# Patient Record
Sex: MALE | Race: WHITE | HISPANIC OR LATINO
[De-identification: names, ages, dates, MRNs, and addresses within clinical notes are randomized per-mention and may not be internally consistent; named-entity substitution may affect disease eponyms.]

---

## 2017-06-14 ENCOUNTER — FORM ENCOUNTER (OUTPATIENT)
Age: 67
End: 2017-06-14

## 2017-06-15 ENCOUNTER — OUTPATIENT (OUTPATIENT)
Dept: OUTPATIENT SERVICES | Facility: HOSPITAL | Age: 67
LOS: 1 days | End: 2017-06-15
Payer: MEDICARE

## 2017-06-15 ENCOUNTER — APPOINTMENT (OUTPATIENT)
Dept: CT IMAGING | Facility: CLINIC | Age: 67
End: 2017-06-15
Payer: MEDICARE

## 2017-06-15 DIAGNOSIS — R93.8 ABNORMAL FINDINGS ON DIAGNOSTIC IMAGING OF OTHER SPECIFIED BODY STRUCTURES: ICD-10-CM

## 2017-06-15 PROCEDURE — 71250 CT THORAX DX C-: CPT | Mod: 26

## 2017-06-15 PROCEDURE — 71250 CT THORAX DX C-: CPT

## 2017-06-21 ENCOUNTER — APPOINTMENT (OUTPATIENT)
Dept: PULMONOLOGY | Facility: CLINIC | Age: 67
End: 2017-06-21

## 2017-06-21 VITALS — BODY MASS INDEX: 25.79 KG/M2 | WEIGHT: 155 LBS

## 2017-06-21 VITALS
RESPIRATION RATE: 16 BRPM | DIASTOLIC BLOOD PRESSURE: 80 MMHG | OXYGEN SATURATION: 98 % | HEART RATE: 68 BPM | SYSTOLIC BLOOD PRESSURE: 122 MMHG

## 2017-06-21 DIAGNOSIS — Z01.811 ENCOUNTER FOR PREPROCEDURAL RESPIRATORY EXAMINATION: ICD-10-CM

## 2017-06-26 ENCOUNTER — MEDICATION RENEWAL (OUTPATIENT)
Age: 67
End: 2017-06-26

## 2017-06-27 ENCOUNTER — MEDICATION RENEWAL (OUTPATIENT)
Age: 67
End: 2017-06-27

## 2017-11-07 ENCOUNTER — APPOINTMENT (OUTPATIENT)
Dept: SURGERY | Facility: CLINIC | Age: 67
End: 2017-11-07
Payer: MEDICARE

## 2017-11-07 VITALS — DIASTOLIC BLOOD PRESSURE: 71 MMHG | SYSTOLIC BLOOD PRESSURE: 136 MMHG | BODY MASS INDEX: 25.63 KG/M2 | WEIGHT: 154 LBS

## 2017-11-07 DIAGNOSIS — Z87.19 PERSONAL HISTORY OF OTHER DISEASES OF THE DIGESTIVE SYSTEM: ICD-10-CM

## 2017-11-07 PROCEDURE — 99214 OFFICE O/P EST MOD 30 MIN: CPT

## 2017-11-28 ENCOUNTER — APPOINTMENT (OUTPATIENT)
Dept: SURGERY | Facility: CLINIC | Age: 67
End: 2017-11-28
Payer: MEDICARE

## 2017-11-28 VITALS
DIASTOLIC BLOOD PRESSURE: 69 MMHG | SYSTOLIC BLOOD PRESSURE: 131 MMHG | WEIGHT: 153 LBS | HEIGHT: 65 IN | BODY MASS INDEX: 25.49 KG/M2

## 2017-11-28 PROCEDURE — 99215 OFFICE O/P EST HI 40 MIN: CPT

## 2017-11-29 ENCOUNTER — OTHER (OUTPATIENT)
Age: 67
End: 2017-11-29

## 2017-11-29 RX ORDER — AZITHROMYCIN DIHYDRATE 250 MG/1
250 TABLET, FILM COATED ORAL
Qty: 1 | Refills: 0 | Status: DISCONTINUED | COMMUNITY
Start: 2017-06-21 | End: 2017-11-29

## 2017-12-04 ENCOUNTER — FORM ENCOUNTER (OUTPATIENT)
Age: 67
End: 2017-12-04

## 2017-12-05 ENCOUNTER — APPOINTMENT (OUTPATIENT)
Dept: CT IMAGING | Facility: CLINIC | Age: 67
End: 2017-12-05
Payer: MEDICARE

## 2017-12-05 ENCOUNTER — OUTPATIENT (OUTPATIENT)
Dept: OUTPATIENT SERVICES | Facility: HOSPITAL | Age: 67
LOS: 1 days | End: 2017-12-05
Payer: MEDICARE

## 2017-12-05 DIAGNOSIS — R93.8 ABNORMAL FINDINGS ON DIAGNOSTIC IMAGING OF OTHER SPECIFIED BODY STRUCTURES: ICD-10-CM

## 2017-12-05 PROCEDURE — 71250 CT THORAX DX C-: CPT

## 2017-12-05 PROCEDURE — 71250 CT THORAX DX C-: CPT | Mod: 26

## 2017-12-21 ENCOUNTER — APPOINTMENT (OUTPATIENT)
Dept: PULMONOLOGY | Facility: CLINIC | Age: 67
End: 2017-12-21
Payer: MEDICARE

## 2017-12-21 VITALS — BODY MASS INDEX: 24.79 KG/M2 | WEIGHT: 149 LBS

## 2017-12-21 VITALS — SYSTOLIC BLOOD PRESSURE: 120 MMHG | DIASTOLIC BLOOD PRESSURE: 70 MMHG | HEART RATE: 75 BPM | OXYGEN SATURATION: 95 %

## 2017-12-21 PROCEDURE — 99215 OFFICE O/P EST HI 40 MIN: CPT | Mod: 25

## 2017-12-21 PROCEDURE — 94010 BREATHING CAPACITY TEST: CPT

## 2017-12-29 ENCOUNTER — OUTPATIENT (OUTPATIENT)
Dept: OUTPATIENT SERVICES | Facility: HOSPITAL | Age: 67
LOS: 1 days | End: 2017-12-29
Payer: MEDICARE

## 2017-12-29 VITALS
WEIGHT: 149.03 LBS | TEMPERATURE: 98 F | HEIGHT: 66 IN | HEART RATE: 69 BPM | DIASTOLIC BLOOD PRESSURE: 68 MMHG | RESPIRATION RATE: 17 BRPM | SYSTOLIC BLOOD PRESSURE: 140 MMHG

## 2017-12-29 DIAGNOSIS — K40.91 UNILATERAL INGUINAL HERNIA, WITHOUT OBSTRUCTION OR GANGRENE, RECURRENT: ICD-10-CM

## 2017-12-29 DIAGNOSIS — K40.90 UNILATERAL INGUINAL HERNIA, WITHOUT OBSTRUCTION OR GANGRENE, NOT SPECIFIED AS RECURRENT: ICD-10-CM

## 2017-12-29 DIAGNOSIS — Z98.890 OTHER SPECIFIED POSTPROCEDURAL STATES: Chronic | ICD-10-CM

## 2017-12-29 DIAGNOSIS — Z01.818 ENCOUNTER FOR OTHER PREPROCEDURAL EXAMINATION: ICD-10-CM

## 2017-12-29 LAB
ALBUMIN SERPL ELPH-MCNC: 3.8 G/DL — SIGNIFICANT CHANGE UP (ref 3.3–5)
ALP SERPL-CCNC: 65 U/L — SIGNIFICANT CHANGE UP (ref 40–120)
ALT FLD-CCNC: 39 U/L — SIGNIFICANT CHANGE UP (ref 12–78)
ANION GAP SERPL CALC-SCNC: 5 MMOL/L — SIGNIFICANT CHANGE UP (ref 5–17)
AST SERPL-CCNC: 26 U/L — SIGNIFICANT CHANGE UP (ref 15–37)
BILIRUB SERPL-MCNC: 0.3 MG/DL — SIGNIFICANT CHANGE UP (ref 0.2–1.2)
BUN SERPL-MCNC: 14 MG/DL — SIGNIFICANT CHANGE UP (ref 7–23)
CALCIUM SERPL-MCNC: 9 MG/DL — SIGNIFICANT CHANGE UP (ref 8.5–10.1)
CHLORIDE SERPL-SCNC: 109 MMOL/L — HIGH (ref 96–108)
CO2 SERPL-SCNC: 30 MMOL/L — SIGNIFICANT CHANGE UP (ref 22–31)
CREAT SERPL-MCNC: 0.94 MG/DL — SIGNIFICANT CHANGE UP (ref 0.5–1.3)
GLUCOSE SERPL-MCNC: 102 MG/DL — HIGH (ref 70–99)
HCT VFR BLD CALC: 45.4 % — SIGNIFICANT CHANGE UP (ref 39–50)
HGB BLD-MCNC: 15.1 G/DL — SIGNIFICANT CHANGE UP (ref 13–17)
MCHC RBC-ENTMCNC: 31.1 PG — SIGNIFICANT CHANGE UP (ref 27–34)
MCHC RBC-ENTMCNC: 33.3 GM/DL — SIGNIFICANT CHANGE UP (ref 32–36)
MCV RBC AUTO: 93.4 FL — SIGNIFICANT CHANGE UP (ref 80–100)
PLATELET # BLD AUTO: 165 K/UL — SIGNIFICANT CHANGE UP (ref 150–400)
POTASSIUM SERPL-MCNC: 4.8 MMOL/L — SIGNIFICANT CHANGE UP (ref 3.5–5.3)
POTASSIUM SERPL-SCNC: 4.8 MMOL/L — SIGNIFICANT CHANGE UP (ref 3.5–5.3)
PROT SERPL-MCNC: 7.6 G/DL — SIGNIFICANT CHANGE UP (ref 6–8.3)
RBC # BLD: 4.86 M/UL — SIGNIFICANT CHANGE UP (ref 4.2–5.8)
RBC # FLD: 12.4 % — SIGNIFICANT CHANGE UP (ref 10.3–14.5)
SODIUM SERPL-SCNC: 144 MMOL/L — SIGNIFICANT CHANGE UP (ref 135–145)
WBC # BLD: 4.9 K/UL — SIGNIFICANT CHANGE UP (ref 3.8–10.5)
WBC # FLD AUTO: 4.9 K/UL — SIGNIFICANT CHANGE UP (ref 3.8–10.5)

## 2017-12-29 PROCEDURE — 93005 ELECTROCARDIOGRAM TRACING: CPT

## 2017-12-29 PROCEDURE — 93010 ELECTROCARDIOGRAM REPORT: CPT | Mod: NC

## 2017-12-29 PROCEDURE — 85027 COMPLETE CBC AUTOMATED: CPT

## 2017-12-29 PROCEDURE — 80053 COMPREHEN METABOLIC PANEL: CPT

## 2017-12-29 PROCEDURE — G0463: CPT

## 2017-12-29 NOTE — H&P PST ADULT - PROBLEM SELECTOR PLAN 1
Adequate: hears normal conversation without difficulty
PST: CBC,CM,EKG  Medical evaluation with Dr. Islas  All preoperative instructions including/CHD cleanse reviewed with patient who verbalized understanding.   Avoid all NSAID/ASA containing products as well as all herbal/vitamin supplements. Tylenol only for pain/headache.

## 2017-12-29 NOTE — H&P PST ADULT - HISTORY OF PRESENT ILLNESS
This 68yo male with a history of Emphysema presents to [facility] for a scheduled Open repair of recurrent right inguinal hernia  with Dr Heller on 1/8/18. He developed pain in his right groin a few months ago. The pain was its worse on Thanksgiving and he "was not wearing my hernia belt". He denies any abdominal pain, N/V, changes in bowels or urinary pattern.

## 2017-12-29 NOTE — H&P PST ADULT - VISION (WITH CORRECTIVE LENSES IF THE PATIENT USUALLY WEARS THEM):
RX Eyeglasses/Normal vision: sees adequately in most situations; can see medication labels, newsprint

## 2017-12-29 NOTE — H&P PST ADULT - PMH
Arthritis  Hands  Asthma    Chronic obstructive pulmonary disease, unspecified COPD type  Pt notes "Slight case of COPD" - Pulmonary note pt has Nodular Opacities on Chest CT  Pneumonia  X 1  Unilateral inguinal hernia without obstruction or gangrene, recurrence not specified

## 2017-12-29 NOTE — H&P PST ADULT - NSANTHOSAYNRD_GEN_A_CORE
No. POLY screening performed.  STOP BANG Legend: 0-2 = LOW Risk; 3-4 = INTERMEDIATE Risk; 5-8 = HIGH Risk

## 2017-12-29 NOTE — H&P PST ADULT - NEGATIVE ENMT SYMPTOMS
no nasal discharge/no throat pain/no hearing difficulty/no sinus symptoms/no ear pain/no nasal congestion

## 2018-01-05 ENCOUNTER — MEDICATION RENEWAL (OUTPATIENT)
Age: 68
End: 2018-01-05

## 2018-01-05 RX ORDER — SODIUM CHLORIDE 9 MG/ML
1000 INJECTION, SOLUTION INTRAVENOUS
Qty: 0 | Refills: 0 | Status: DISCONTINUED | OUTPATIENT
Start: 2018-01-08 | End: 2018-01-08

## 2018-01-08 ENCOUNTER — APPOINTMENT (OUTPATIENT)
Dept: SURGERY | Facility: HOSPITAL | Age: 68
End: 2018-01-08

## 2018-01-08 ENCOUNTER — OUTPATIENT (OUTPATIENT)
Dept: OUTPATIENT SERVICES | Facility: HOSPITAL | Age: 68
LOS: 1 days | End: 2018-01-08
Payer: MEDICARE

## 2018-01-08 ENCOUNTER — TRANSCRIPTION ENCOUNTER (OUTPATIENT)
Age: 68
End: 2018-01-08

## 2018-01-08 VITALS
DIASTOLIC BLOOD PRESSURE: 53 MMHG | HEART RATE: 75 BPM | RESPIRATION RATE: 14 BRPM | SYSTOLIC BLOOD PRESSURE: 120 MMHG | OXYGEN SATURATION: 96 %

## 2018-01-08 VITALS
DIASTOLIC BLOOD PRESSURE: 58 MMHG | RESPIRATION RATE: 16 BRPM | HEART RATE: 66 BPM | OXYGEN SATURATION: 98 % | HEIGHT: 66 IN | SYSTOLIC BLOOD PRESSURE: 116 MMHG | TEMPERATURE: 97 F | WEIGHT: 145.06 LBS

## 2018-01-08 DIAGNOSIS — K40.91 UNILATERAL INGUINAL HERNIA, WITHOUT OBSTRUCTION OR GANGRENE, RECURRENT: ICD-10-CM

## 2018-01-08 DIAGNOSIS — Z98.890 OTHER SPECIFIED POSTPROCEDURAL STATES: Chronic | ICD-10-CM

## 2018-01-08 DIAGNOSIS — Z01.818 ENCOUNTER FOR OTHER PREPROCEDURAL EXAMINATION: ICD-10-CM

## 2018-01-08 PROCEDURE — 49520 REREPAIR ING HERNIA REDUCE: CPT | Mod: RT

## 2018-01-08 PROCEDURE — C1781: CPT

## 2018-01-08 PROCEDURE — 49520 REREPAIR ING HERNIA REDUCE: CPT | Mod: AS,RT

## 2018-01-08 RX ORDER — CEFAZOLIN SODIUM 1 G
2000 VIAL (EA) INJECTION ONCE
Qty: 0 | Refills: 0 | Status: COMPLETED | OUTPATIENT
Start: 2018-01-08 | End: 2018-01-08

## 2018-01-08 RX ORDER — OXYCODONE HYDROCHLORIDE 5 MG/1
5 TABLET ORAL ONCE
Qty: 0 | Refills: 0 | Status: DISCONTINUED | OUTPATIENT
Start: 2018-01-08 | End: 2018-01-08

## 2018-01-08 RX ORDER — SODIUM CHLORIDE 9 MG/ML
1000 INJECTION, SOLUTION INTRAVENOUS
Qty: 0 | Refills: 0 | Status: DISCONTINUED | OUTPATIENT
Start: 2018-01-08 | End: 2018-01-08

## 2018-01-08 RX ORDER — ACETAMINOPHEN 500 MG
1000 TABLET ORAL ONCE
Qty: 0 | Refills: 0 | Status: COMPLETED | OUTPATIENT
Start: 2018-01-08 | End: 2018-01-08

## 2018-01-08 RX ORDER — HYDROMORPHONE HYDROCHLORIDE 2 MG/ML
0.5 INJECTION INTRAMUSCULAR; INTRAVENOUS; SUBCUTANEOUS
Qty: 0 | Refills: 0 | Status: DISCONTINUED | OUTPATIENT
Start: 2018-01-08 | End: 2018-01-08

## 2018-01-08 RX ORDER — UMECLIDINIUM 62.5 UG/1
0 AEROSOL, POWDER ORAL
Qty: 0 | Refills: 0 | COMMUNITY

## 2018-01-08 RX ADMIN — HYDROMORPHONE HYDROCHLORIDE 0.5 MILLIGRAM(S): 2 INJECTION INTRAMUSCULAR; INTRAVENOUS; SUBCUTANEOUS at 16:03

## 2018-01-08 RX ADMIN — SODIUM CHLORIDE 110 MILLILITER(S): 9 INJECTION, SOLUTION INTRAVENOUS at 16:01

## 2018-01-08 RX ADMIN — HYDROMORPHONE HYDROCHLORIDE 0.5 MILLIGRAM(S): 2 INJECTION INTRAMUSCULAR; INTRAVENOUS; SUBCUTANEOUS at 16:14

## 2018-01-08 RX ADMIN — SODIUM CHLORIDE 40 MILLILITER(S): 9 INJECTION, SOLUTION INTRAVENOUS at 11:16

## 2018-01-08 NOTE — ASU PATIENT PROFILE, ADULT - VISION (WITH CORRECTIVE LENSES IF THE PATIENT USUALLY WEARS THEM):
Normal vision: sees adequately in most situations; can see medication labels, newsprint/RX Eyeglasses

## 2018-01-08 NOTE — ASU PATIENT PROFILE, ADULT - METHOD:
will fax PST results to PCP and Pulmonologist for review and medical clearance & Pulmonary Clearance

## 2018-01-08 NOTE — ASU DISCHARGE PLAN (ADULT/PEDIATRIC). - MEDICATION SUMMARY - MEDICATIONS TO TAKE
I will START or STAY ON the medications listed below when I get home from the hospital:    Percocet 5/325 oral tablet  -- 1 tab(s) by mouth every 4 to 6 hours, As Needed -for severe pain MDD:6   -- Caution federal law prohibits the transfer of this drug to any person other  than the person for whom it was prescribed.  May cause drowsiness.  Alcohol may intensify this effect.  Use care when operating dangerous machinery.  This prescription cannot be refilled.  This product contains acetaminophen.  Do not use  with any other product containing acetaminophen to prevent possible liver damage.  Using more of this medication than prescribed may cause serious breathing problems.    -- Indication: For pain    Incruse Ellipta 62.5 mcg/inh inhalation powder  -- 1 puff daily  -- Indication: For prior med    Advair Diskus 250 mcg-50 mcg inhalation powder  -- 1 puff(s) inhaled once a day  -- Pt Notes if he takes Advair MORE THAN once a day he developes Pruritis on his back - only takes once a day - no side effects  -- Indication: For prior med

## 2018-01-08 NOTE — BRIEF OPERATIVE NOTE - PRE-OP DX
Unilateral recurrent inguinal hernia without obstruction or gangrene  01/08/2018    Active  Daniel Cid

## 2018-01-08 NOTE — BRIEF OPERATIVE NOTE - POST-OP DX
Unilateral inguinal hernia without obstruction or gangrene, recurrent  01/08/2018    Active  Daniel Cid

## 2018-01-08 NOTE — ASU DISCHARGE PLAN (ADULT/PEDIATRIC). - NOTIFY
Bleeding that does not stop/Pain not relieved by Medications/Fever greater than 101 Persistent Nausea and Vomiting/Bleeding that does not stop/Unable to Urinate/Pain not relieved by Medications/Fever greater than 101

## 2018-01-08 NOTE — ASU PATIENT PROFILE, ADULT - PMH
Arthritis  Hands  Asthma    Chronic obstructive pulmonary disease, unspecified COPD type  Pt notes "Slight case of COPD"  Pneumonia  X 1  Unilateral inguinal hernia without obstruction or gangrene, recurrence not specified

## 2018-01-08 NOTE — BRIEF OPERATIVE NOTE - PROCEDURE
<<-----Click on this checkbox to enter Procedure Herniorrhaphy, inguinal  01/08/2018  Recurrent right inguinal herniorrhaphy  Active  HORTENCIA

## 2018-01-16 ENCOUNTER — APPOINTMENT (OUTPATIENT)
Dept: SURGERY | Facility: CLINIC | Age: 68
End: 2018-01-16
Payer: MEDICARE

## 2018-01-16 DIAGNOSIS — K40.91 UNILATERAL INGUINAL HERNIA, W/OUT OBSTRUCTION OR GANGRENE, RECURRENT: ICD-10-CM

## 2018-01-16 PROCEDURE — 99024 POSTOP FOLLOW-UP VISIT: CPT

## 2018-02-13 ENCOUNTER — APPOINTMENT (OUTPATIENT)
Dept: SURGERY | Facility: CLINIC | Age: 68
End: 2018-02-13
Payer: MEDICARE

## 2018-02-13 DIAGNOSIS — R10.31 RIGHT LOWER QUADRANT PAIN: ICD-10-CM

## 2018-02-13 PROCEDURE — 99024 POSTOP FOLLOW-UP VISIT: CPT

## 2018-02-16 PROBLEM — R10.31 RIGHT INGUINAL PAIN: Status: RESOLVED | Noted: 2017-11-07 | Resolved: 2018-02-16

## 2018-06-28 ENCOUNTER — APPOINTMENT (OUTPATIENT)
Dept: PULMONOLOGY | Facility: CLINIC | Age: 68
End: 2018-06-28
Payer: MEDICARE

## 2018-06-28 VITALS — WEIGHT: 152 LBS | BODY MASS INDEX: 25.29 KG/M2

## 2018-06-28 VITALS — OXYGEN SATURATION: 97 % | HEART RATE: 76 BPM | SYSTOLIC BLOOD PRESSURE: 124 MMHG | DIASTOLIC BLOOD PRESSURE: 80 MMHG

## 2018-06-28 PROCEDURE — 94010 BREATHING CAPACITY TEST: CPT

## 2018-06-28 PROCEDURE — 99214 OFFICE O/P EST MOD 30 MIN: CPT | Mod: 25

## 2018-07-23 ENCOUNTER — RX RENEWAL (OUTPATIENT)
Age: 68
End: 2018-07-23

## 2018-08-23 ENCOUNTER — RX RENEWAL (OUTPATIENT)
Age: 68
End: 2018-08-23

## 2018-09-04 ENCOUNTER — RX RENEWAL (OUTPATIENT)
Age: 68
End: 2018-09-04

## 2018-09-26 ENCOUNTER — MEDICATION RENEWAL (OUTPATIENT)
Age: 68
End: 2018-09-26

## 2019-01-09 ENCOUNTER — FORM ENCOUNTER (OUTPATIENT)
Age: 69
End: 2019-01-09

## 2019-01-10 ENCOUNTER — APPOINTMENT (OUTPATIENT)
Dept: CT IMAGING | Facility: CLINIC | Age: 69
End: 2019-01-10
Payer: MEDICARE

## 2019-01-10 ENCOUNTER — OUTPATIENT (OUTPATIENT)
Dept: OUTPATIENT SERVICES | Facility: HOSPITAL | Age: 69
LOS: 1 days | End: 2019-01-10
Payer: MEDICARE

## 2019-01-10 DIAGNOSIS — R93.89 ABNORMAL FINDINGS ON DIAGNOSTIC IMAGING OF OTHER SPECIFIED BODY STRUCTURES: ICD-10-CM

## 2019-01-10 DIAGNOSIS — Z98.890 OTHER SPECIFIED POSTPROCEDURAL STATES: Chronic | ICD-10-CM

## 2019-01-10 DIAGNOSIS — R91.1 SOLITARY PULMONARY NODULE: ICD-10-CM

## 2019-01-10 PROCEDURE — 71250 CT THORAX DX C-: CPT | Mod: 26

## 2019-01-10 PROCEDURE — 71250 CT THORAX DX C-: CPT

## 2019-02-14 ENCOUNTER — APPOINTMENT (OUTPATIENT)
Dept: PULMONOLOGY | Facility: CLINIC | Age: 69
End: 2019-02-14
Payer: MEDICARE

## 2019-02-14 VITALS
DIASTOLIC BLOOD PRESSURE: 76 MMHG | SYSTOLIC BLOOD PRESSURE: 138 MMHG | OXYGEN SATURATION: 97 % | WEIGHT: 147 LBS | BODY MASS INDEX: 24.46 KG/M2 | HEART RATE: 79 BPM

## 2019-02-14 PROCEDURE — 99214 OFFICE O/P EST MOD 30 MIN: CPT

## 2019-02-14 NOTE — PROCEDURE
[FreeTextEntry1] : PFTs performed previously revealed moderate COPD with elevated lung volumes and diffusion capacity. These results were essentially at his baseline though spirometry is slightly worse and may be due to his non-compliance with medications.\par Spirometry 6/28/18 - near baseline and was improved from his previous and is c/w mild COPD (Stage II); he is more compliant with his inhaler therapy

## 2019-02-14 NOTE — REVIEW OF SYSTEMS
[Snoring] : snoring [Fever] : no fever [Chills] : no chills [Dry Eyes] : no dryness of the eyes [Eye Irritation] : no ~T irritation of the eyes [Nasal Congestion] : no nasal congestion [Epistaxis] : no nosebleeds [Postnasal Drip] : no postnasal drip [Sinus Problems] : no sinus problems [Cough] : no cough [Sputum] : not coughing up ~M sputum [Hemoptysis] : no hemoptysis [Dyspnea] : no dyspnea [Chest Tightness] : no chest tightness [Pleuritic Pain] : no pleuritic pain [Wheezing] : no wheezing [Hypertension] : no ~T hypertension [Chest Discomfort] : no chest discomfort [Dysrhythmia] : no dysrhythmia [Murmurs] : no murmurs were heard [Palpitations] : no palpitations [Edema] : ~T edema was not present [Hay Fever] : no hay fever [Reflux] : no reflux [Nausea] : no nausea [Vomiting] : no vomiting [Constipation] : no constipation [Diarrhea] : no diarrhea [Abdominal Pain] : no abdominal pain [Dysuria] : no dysuria [Trauma] : no ~T physical trauma [Fracture] : no fracture [Back Pain] : ~T no back pain [Anemia] : no anemia [Headache] : no headache [Dizziness] : no dizziness [Syncope] : no fainting [Numbness] : no numbness [Paralysis] : no paralysis was seen [Seizures] : no seizures [Depression] : no depression [Anxiety] : no anxiety [Diabetes] : no diabetes mellitus [Thyroid Problem] : no thyroid problem [Difficulty Initiating Sleep] : no difficulty falling asleep [Difficulty Maintaining Sleep] : no difficulty maintaining sleep [Witnessed Apneas] : demonstrated no ~M apnea [Nonrestorative Sleep] : restorative sleep

## 2019-02-14 NOTE — CONSULT LETTER
[Dear  ___] : Dear  [unfilled], [Consult Letter:] : I had the pleasure of evaluating your patient, [unfilled]. [Please see my note below.] : Please see my note below. [Consult Closing:] : Thank you very much for allowing me to participate in the care of this patient.  If you have any questions, please do not hesitate to contact me. [Sincerely,] : Sincerely, [Richardson Babcock MD] : Richardson Babcock MD [FreeTextEntry3] : Richardson Babcock MD, FCCP, CHANDLER. ABSM\par

## 2019-02-14 NOTE — DISCUSSION/SUMMARY
[FreeTextEntry1] : \par    #1. The patient's COPD appears to be at baseline clinically. He has not been compliant with his Advair 500/50 and  Atrovent, without any further rash.  He uses his inhalers when he wants but reports improved compliance\par #2. He will also continue his prn Ventolin.  \par #3. I have also recommended diet and exercise for weight loss. \par #4. Chest CT with waxing and waning opacities but current is stable \par #5. Follow-up CT in 12 months (1-2/2010) for reevaluation of GGO and possible mucoid impaction\par #6. F/u in 6 months with PFTs

## 2019-02-14 NOTE — REASON FOR VISIT
[Follow-Up] : a follow-up visit [Abnormal CT Scan] : abnormal CT Scan [COPD] : COPD [Shortness of Breath] : shortness of Breath [FreeTextEntry2] : GGO/nodule, SOBOE

## 2019-02-14 NOTE — HISTORY OF PRESENT ILLNESS
[Doing Well] : doing well [Goals--Doing Well] : the patient is doing well with ~his/her~ COPD goals [CT Scan] : a CT scan [On ___] : performed on [unfilled] [Patient] : the patient [Indication ___] : for an indication of [unfilled] [None] : no new symptoms reported [FreeTextEntry1] : The patient is also followed for nodular opacities seen on previous chest CT which were thought to be due to mucoid impaction. Repeat chest CT revealed a new 6 mm GGO in ODALIS and tree-in-bud area in RUL. He was asymptomatic without any respiratory complaints. I had recommended a f/u chest CT in 4-5 months but he wanted to wait longer than that. Repeat CT revealed resolution of a 1.2 cm RUL opacity and possible mild progression of tree-in-bud nodularity in RML and LLL. Pt did not want to repeat an imaging study for at least one year. He understands the risk of progression of disease but because he is feeling well, he would like to wait.\par Pt did well with hernia surgery in the past.\par He is somewhat non-compliant with inhaler therapy and recommendations for imaging studies at times but is more compliant now. [Adherent] : the patient is not adherent with ~his/her~ medication regimen [FreeTextEntry5] : Chest CT [FreeTextEntry8] : New 1.2 cm GGO in RUL with otherwise stable or improved changes

## 2019-02-14 NOTE — PHYSICAL EXAM
[Normal Appearance] : normal appearance [Normal Conjunctiva] : the conjunctiva exhibited no abnormalities [Low Lying Soft Palate] : low lying soft palate [Elongated Uvula] : elongated uvula [Enlarged Base of the Tongue] : enlargement of the base of the tongue [III] : III [Neck Appearance] : the appearance of the neck was normal [Heart Rate And Rhythm] : heart rate and rhythm were normal [Heart Sounds] : normal S1 and S2 [Murmurs] : no murmurs present [Edema] : no peripheral edema present [] : no respiratory distress [Respiration, Rhythm And Depth] : normal respiratory rhythm and effort [Exaggerated Use Of Accessory Muscles For Inspiration] : no accessory muscle use [Auscultation Breath Sounds / Voice Sounds] : lungs were clear to auscultation bilaterally [Abdomen Soft] : soft [Abdomen Tenderness] : non-tender [Abnormal Walk] : normal gait [Nail Clubbing] : no clubbing of the fingernails [Cyanosis, Localized] : no localized cyanosis [Oriented To Time, Place, And Person] : oriented to person, place, and time [FreeTextEntry1] : No abnormalities

## 2019-02-22 ENCOUNTER — RX RENEWAL (OUTPATIENT)
Age: 69
End: 2019-02-22

## 2019-08-14 ENCOUNTER — APPOINTMENT (OUTPATIENT)
Dept: PULMONOLOGY | Facility: CLINIC | Age: 69
End: 2019-08-14
Payer: MEDICARE

## 2019-08-14 VITALS
DIASTOLIC BLOOD PRESSURE: 80 MMHG | BODY MASS INDEX: 25.79 KG/M2 | HEART RATE: 67 BPM | SYSTOLIC BLOOD PRESSURE: 130 MMHG | RESPIRATION RATE: 14 BRPM | OXYGEN SATURATION: 95 % | WEIGHT: 155 LBS

## 2019-08-14 PROCEDURE — 85018 HEMOGLOBIN: CPT | Mod: QW

## 2019-08-14 PROCEDURE — 94727 GAS DIL/WSHOT DETER LNG VOL: CPT

## 2019-08-14 PROCEDURE — 94729 DIFFUSING CAPACITY: CPT

## 2019-08-14 PROCEDURE — 94010 BREATHING CAPACITY TEST: CPT

## 2019-08-14 PROCEDURE — 99214 OFFICE O/P EST MOD 30 MIN: CPT | Mod: 25

## 2019-08-14 NOTE — HISTORY OF PRESENT ILLNESS
[Doing Well] : doing well [Goals--Doing Well] : the patient is doing well with ~his/her~ COPD goals [CT Scan] : a CT scan [On ___] : performed on [unfilled] [Patient] : the patient [Indication ___] : for an indication of [unfilled] [None] : no new symptoms reported [FreeTextEntry1] : The patient is also followed for nodular opacities seen on previous chest CT which were thought to be due to mucoid impaction. Repeat chest CT revealed a new 6 mm GGO in ODALIS and tree-in-bud area in RUL. He was asymptomatic without any respiratory complaints. He has had waxing and waning nodules and tree-in-bud opacities for years.\par He is somewhat non-compliant with inhaler therapy and recommendations for imaging studies at times but is more compliant now. [Adherent] : the patient is not adherent with ~his/her~ medication regimen [FreeTextEntry8] : New 1.2 cm GGO in RUL with otherwise stable or improved changes [FreeTextEntry5] : Chest CT

## 2019-08-14 NOTE — CONSULT LETTER
[Dear  ___] : Dear  [unfilled], [Consult Letter:] : I had the pleasure of evaluating your patient, [unfilled]. [Consult Closing:] : Thank you very much for allowing me to participate in the care of this patient.  If you have any questions, please do not hesitate to contact me. [Please see my note below.] : Please see my note below. [Sincerely,] : Sincerely, [Richardson Babcock MD] : Richardson Babcock MD [FreeTextEntry3] : Richardson Babcock MD, FCCP, CHANDLER. ABSM\par

## 2019-08-14 NOTE — DISCUSSION/SUMMARY
[FreeTextEntry1] : \par    #1. The patient's COPD appears to be at baseline clinically on Advair 250/50 and Incruse; alternatively could use Trelegy daily\par #2. He will also continue his prn Ventolin.  \par #3. I have also recommended diet and exercise for weight loss. \par #4. Chest CT with waxing and waning opacities but current is stable; repeat in 1-2/2020\par #5. Follow-up CT in 12 months (1-2/2010) for reevaluation of GGO and possible mucoid impaction\par #6. F/u in 6 months with CT

## 2019-08-14 NOTE — REVIEW OF SYSTEMS
[Snoring] : snoring [Fever] : no fever [Dry Eyes] : no dryness of the eyes [Chills] : no chills [Nasal Congestion] : no nasal congestion [Eye Irritation] : no ~T irritation of the eyes [Epistaxis] : no nosebleeds [Postnasal Drip] : no postnasal drip [Sinus Problems] : no sinus problems [Sputum] : not coughing up ~M sputum [Cough] : no cough [Dyspnea] : no dyspnea [Hemoptysis] : no hemoptysis [Pleuritic Pain] : no pleuritic pain [Chest Tightness] : no chest tightness [Hypertension] : no ~T hypertension [Wheezing] : no wheezing [Dysrhythmia] : no dysrhythmia [Chest Discomfort] : no chest discomfort [Murmurs] : no murmurs were heard [Palpitations] : no palpitations [Edema] : ~T edema was not present [Hay Fever] : no hay fever [Reflux] : no reflux [Vomiting] : no vomiting [Nausea] : no nausea [Diarrhea] : no diarrhea [Constipation] : no constipation [Abdominal Pain] : no abdominal pain [Dysuria] : no dysuria [Fracture] : no fracture [Trauma] : no ~T physical trauma [Anemia] : no anemia [Back Pain] : ~T no back pain [Headache] : no headache [Dizziness] : no dizziness [Syncope] : no fainting [Seizures] : no seizures [Paralysis] : no paralysis was seen [Numbness] : no numbness [Depression] : no depression [Anxiety] : no anxiety [Diabetes] : no diabetes mellitus [Thyroid Problem] : no thyroid problem [Difficulty Initiating Sleep] : no difficulty falling asleep [Difficulty Maintaining Sleep] : no difficulty maintaining sleep [Witnessed Apneas] : demonstrated no ~M apnea [Nonrestorative Sleep] : restorative sleep

## 2019-08-14 NOTE — PHYSICAL EXAM
[Normal Appearance] : normal appearance [Normal Conjunctiva] : the conjunctiva exhibited no abnormalities [Low Lying Soft Palate] : low lying soft palate [Elongated Uvula] : elongated uvula [III] : III [Enlarged Base of the Tongue] : enlargement of the base of the tongue [Neck Appearance] : the appearance of the neck was normal [Heart Rate And Rhythm] : heart rate and rhythm were normal [Heart Sounds] : normal S1 and S2 [Murmurs] : no murmurs present [Edema] : no peripheral edema present [Respiration, Rhythm And Depth] : normal respiratory rhythm and effort [] : no respiratory distress [Auscultation Breath Sounds / Voice Sounds] : lungs were clear to auscultation bilaterally [Exaggerated Use Of Accessory Muscles For Inspiration] : no accessory muscle use [Abdomen Tenderness] : non-tender [Abdomen Soft] : soft [Abnormal Walk] : normal gait [Cyanosis, Localized] : no localized cyanosis [Nail Clubbing] : no clubbing of the fingernails [No Focal Deficits] : no focal deficits [Oriented To Time, Place, And Person] : oriented to person, place, and time [FreeTextEntry1] : No abnormalities

## 2019-09-05 RX ORDER — UMECLIDINIUM 62.5 UG/1
62.5 AEROSOL, POWDER ORAL DAILY
Qty: 1 | Refills: 5 | Status: DISCONTINUED | COMMUNITY
Start: 2017-06-26 | End: 2019-09-05

## 2020-02-04 ENCOUNTER — FORM ENCOUNTER (OUTPATIENT)
Age: 70
End: 2020-02-04

## 2020-02-05 ENCOUNTER — OUTPATIENT (OUTPATIENT)
Dept: OUTPATIENT SERVICES | Facility: HOSPITAL | Age: 70
LOS: 1 days | End: 2020-02-05
Payer: MEDICARE

## 2020-02-05 ENCOUNTER — APPOINTMENT (OUTPATIENT)
Dept: CT IMAGING | Facility: CLINIC | Age: 70
End: 2020-02-05
Payer: MEDICARE

## 2020-02-05 DIAGNOSIS — R93.89 ABNORMAL FINDINGS ON DIAGNOSTIC IMAGING OF OTHER SPECIFIED BODY STRUCTURES: ICD-10-CM

## 2020-02-05 DIAGNOSIS — Z98.890 OTHER SPECIFIED POSTPROCEDURAL STATES: Chronic | ICD-10-CM

## 2020-02-05 PROCEDURE — 71250 CT THORAX DX C-: CPT

## 2020-02-05 PROCEDURE — 71250 CT THORAX DX C-: CPT | Mod: 26

## 2020-02-18 ENCOUNTER — APPOINTMENT (OUTPATIENT)
Dept: PULMONOLOGY | Facility: CLINIC | Age: 70
End: 2020-02-18
Payer: MEDICARE

## 2020-02-18 VITALS
WEIGHT: 151 LBS | DIASTOLIC BLOOD PRESSURE: 66 MMHG | HEART RATE: 70 BPM | BODY MASS INDEX: 25.16 KG/M2 | HEIGHT: 65 IN | SYSTOLIC BLOOD PRESSURE: 136 MMHG | OXYGEN SATURATION: 96 %

## 2020-02-18 PROCEDURE — 99214 OFFICE O/P EST MOD 30 MIN: CPT

## 2020-02-18 NOTE — REVIEW OF SYSTEMS
Patient had a trop level of 2.46. [Snoring] : snoring [Fever] : no fever [Chills] : no chills [Dry Eyes] : no dryness of the eyes [Eye Irritation] : no ~T irritation of the eyes [Nasal Congestion] : no nasal congestion [Sinus Problems] : no sinus problems [Postnasal Drip] : no postnasal drip [Epistaxis] : no nosebleeds [Hemoptysis] : no hemoptysis [Sputum] : not coughing up ~M sputum [Cough] : no cough [Chest Tightness] : no chest tightness [Dyspnea] : no dyspnea [Pleuritic Pain] : no pleuritic pain [Hypertension] : no ~T hypertension [Wheezing] : no wheezing [Dysrhythmia] : no dysrhythmia [Chest Discomfort] : no chest discomfort [Palpitations] : no palpitations [Murmurs] : no murmurs were heard [Hay Fever] : no hay fever [Edema] : ~T edema was not present [Vomiting] : no vomiting [Reflux] : no reflux [Nausea] : no nausea [Constipation] : no constipation [Diarrhea] : no diarrhea [Abdominal Pain] : no abdominal pain [Dysuria] : no dysuria [Back Pain] : ~T no back pain [Fracture] : no fracture [Trauma] : no ~T physical trauma [Anemia] : no anemia [Headache] : no headache [Numbness] : no numbness [Syncope] : no fainting [Dizziness] : no dizziness [Seizures] : no seizures [Depression] : no depression [Paralysis] : no paralysis was seen [Diabetes] : no diabetes mellitus [Anxiety] : no anxiety [Thyroid Problem] : no thyroid problem [Difficulty Initiating Sleep] : no difficulty falling asleep [Difficulty Maintaining Sleep] : no difficulty maintaining sleep [Witnessed Apneas] : demonstrated no ~M apnea [Nonrestorative Sleep] : restorative sleep

## 2020-02-18 NOTE — PHYSICAL EXAM
[Normal Appearance] : normal appearance [Normal Conjunctiva] : the conjunctiva exhibited no abnormalities [Low Lying Soft Palate] : low lying soft palate [Elongated Uvula] : elongated uvula [Enlarged Base of the Tongue] : enlargement of the base of the tongue [III] : III [Neck Appearance] : the appearance of the neck was normal [Heart Rate And Rhythm] : heart rate and rhythm were normal [Heart Sounds] : normal S1 and S2 [Murmurs] : no murmurs present [Edema] : no peripheral edema present [] : no respiratory distress [Respiration, Rhythm And Depth] : normal respiratory rhythm and effort [Exaggerated Use Of Accessory Muscles For Inspiration] : no accessory muscle use [Auscultation Breath Sounds / Voice Sounds] : lungs were clear to auscultation bilaterally [Abdomen Tenderness] : non-tender [Abdomen Soft] : soft [Abnormal Walk] : normal gait [Nail Clubbing] : no clubbing of the fingernails [Cyanosis, Localized] : no localized cyanosis [No Focal Deficits] : no focal deficits [Oriented To Time, Place, And Person] : oriented to person, place, and time [FreeTextEntry1] : No abnormalities

## 2020-02-18 NOTE — PROCEDURE
[FreeTextEntry1] : PFTs performed previously revealed moderate COPD with elevated lung volumes and diffusion capacity. These results were essentially at his baseline though spirometry is slightly worse and may be due to his non-compliance with medications.\par Spirometry 6/28/18 - near baseline and was improved from his previous and is c/w mild COPD (Stage II); he is more compliant with his inhaler therapy\par PFTs 8/14/19 - Normal FVC and mildly reduced FEV1 with an obstructive pattern with elevated lung volumes c/w hyperinflation and gas trapping but normal diffusion capacity

## 2020-02-18 NOTE — HISTORY OF PRESENT ILLNESS
[Doing Well] : doing well [Goals--Doing Well] : the patient is doing well with ~his/her~ COPD goals [CT Scan] : a CT scan [On ___] : performed on [unfilled] [Patient] : the patient [None] : no new symptoms reported [Indication ___] : for an indication of [unfilled] [Adherent] : the patient is not adherent with ~his/her~ medication regimen [FreeTextEntry1] : The patient is also followed for nodular opacities seen on previous chest CT which were thought to be due to mucoid impaction. Repeat chest CT revealed a new 6 mm GGO in ODALIS and tree-in-bud area in RUL. He was asymptomatic without any respiratory complaints. He has had waxing and waning nodules and tree-in-bud opacities for years.\par He is somewhat non-compliant with inhaler therapy and recommendations for imaging studies at times but is more compliant now. [FreeTextEntry5] : Chest CT [FreeTextEntry8] : New 1.2 cm GGO in RUL with otherwise stable or improved changes

## 2020-02-18 NOTE — DISCUSSION/SUMMARY
[FreeTextEntry1] : \par    #1. The patient's COPD appears to be at baseline clinically on Advair 250/50 and Incruse; alternatively could use Trelegy daily\par #2. He will also continue his prn Ventolin.  \par #3. I have also recommended diet and exercise for weight loss. \par #4. Chest CT with waxing and waning opacities\par #5. Follow-up CT revealed a new small GGO in RUL otherwise with stable findings. A repeat CT was recommended for 3 months but pt does not want to do for 6 months and understands the potential risk of malignancy.  \par #6. F/u in 6 months with CT and PFTs

## 2020-02-18 NOTE — CONSULT LETTER
[Dear  ___] : Dear  [unfilled], [Consult Letter:] : I had the pleasure of evaluating your patient, [unfilled]. [Please see my note below.] : Please see my note below. [Consult Closing:] : Thank you very much for allowing me to participate in the care of this patient.  If you have any questions, please do not hesitate to contact me. [Sincerely,] : Sincerely, [Richardson Babcock MD] : Richardson Babcock MD [FreeTextEntry3] : Richardson Babcock MD, FCCP, D. ABSM\par Pulmonary and Sleep Medicine\par [facility] [place]

## 2020-05-18 ENCOUNTER — APPOINTMENT (OUTPATIENT)
Dept: PULMONOLOGY | Facility: CLINIC | Age: 70
End: 2020-05-18

## 2020-08-10 ENCOUNTER — RX RENEWAL (OUTPATIENT)
Age: 70
End: 2020-08-10

## 2020-10-09 ENCOUNTER — APPOINTMENT (OUTPATIENT)
Dept: PULMONOLOGY | Facility: CLINIC | Age: 70
End: 2020-10-09
Payer: MEDICARE

## 2020-10-09 VITALS
SYSTOLIC BLOOD PRESSURE: 144 MMHG | OXYGEN SATURATION: 97 % | DIASTOLIC BLOOD PRESSURE: 76 MMHG | HEIGHT: 64 IN | WEIGHT: 146 LBS | BODY MASS INDEX: 24.92 KG/M2 | HEART RATE: 67 BPM

## 2020-10-09 PROCEDURE — 99214 OFFICE O/P EST MOD 30 MIN: CPT

## 2020-10-09 NOTE — CONSULT LETTER
[Dear  ___] : Dear  [unfilled], [Consult Letter:] : I had the pleasure of evaluating your patient, [unfilled]. [Please see my note below.] : Please see my note below. [Consult Closing:] : Thank you very much for allowing me to participate in the care of this patient.  If you have any questions, please do not hesitate to contact me. [Sincerely,] : Sincerely, [Richardson Babcock MD] : Richardson Babcock MD [FreeTextEntry3] : Richardson Babcock MD, FCCP, D. ABSM\par Pulmonary and Sleep Medicine\par [facility] [place]

## 2020-10-09 NOTE — HISTORY OF PRESENT ILLNESS
[Doing Well] : doing well [Goals--Doing Well] : the patient is doing well with ~his/her~ COPD goals [CT Scan] : a CT scan [On ___] : performed on [unfilled] [Patient] : the patient [Indication ___] : for an indication of [unfilled] [None] : no new symptoms reported [FreeTextEntry1] : The patient is also followed for nodular opacities seen on previous chest CT which were thought to be due to mucoid impaction. Repeat chest CT revealed a new 6 mm GGO in ODALIS and tree-in-bud area in RUL. He was asymptomatic without any respiratory complaints. He has had waxing and waning nodules and tree-in-bud opacities for years.\par He is somewhat non-compliant with inhaler therapy and recommendations for imaging studies at times but is more compliant now. [Adherent] : the patient is not adherent with ~his/her~ medication regimen [FreeTextEntry5] : Chest CT [FreeTextEntry8] : New 1.2 cm GGO in RUL with otherwise stable or improved changes

## 2020-10-09 NOTE — DISCUSSION/SUMMARY
[FreeTextEntry1] : \par    #1. The patient's COPD appears to be at baseline clinically on Advair 250/50 and Incruse; alternatively could use Trelegy daily\par #2. He will also continue his prn Ventolin.  \par #3. I have also recommended diet and exercise for weight loss. \par #4. Chest CT with waxing and waning opacities\par #5. Follow-up CT revealed a new small GGO in RUL otherwise with stable findings. A repeat CT was recommended for 3 months but pt does not want to do for 12 months and understands the potential risk of malignancy.  \par #6. F/u in 6 months with CT and PFTs as he did not have either done for this visit and does not want until Feb 2021

## 2020-10-09 NOTE — PHYSICAL EXAM
[Normal Appearance] : normal appearance [Normal Conjunctiva] : the conjunctiva exhibited no abnormalities [Low Lying Soft Palate] : low lying soft palate [Elongated Uvula] : elongated uvula [Enlarged Base of the Tongue] : enlargement of the base of the tongue [III] : III [Neck Appearance] : the appearance of the neck was normal [Heart Rate And Rhythm] : heart rate and rhythm were normal [Heart Sounds] : normal S1 and S2 [Murmurs] : no murmurs present [Edema] : no peripheral edema present [] : no respiratory distress [Respiration, Rhythm And Depth] : normal respiratory rhythm and effort [Exaggerated Use Of Accessory Muscles For Inspiration] : no accessory muscle use [Auscultation Breath Sounds / Voice Sounds] : lungs were clear to auscultation bilaterally [Abdomen Soft] : soft [Abdomen Tenderness] : non-tender [Abnormal Walk] : normal gait [Nail Clubbing] : no clubbing of the fingernails [Cyanosis, Localized] : no localized cyanosis [No Focal Deficits] : no focal deficits [Oriented To Time, Place, And Person] : oriented to person, place, and time [FreeTextEntry1] : No abnormalities

## 2021-03-11 ENCOUNTER — OUTPATIENT (OUTPATIENT)
Dept: OUTPATIENT SERVICES | Facility: HOSPITAL | Age: 71
LOS: 1 days | End: 2021-03-11
Payer: MEDICARE

## 2021-03-11 ENCOUNTER — RESULT REVIEW (OUTPATIENT)
Age: 71
End: 2021-03-11

## 2021-03-11 ENCOUNTER — APPOINTMENT (OUTPATIENT)
Dept: CT IMAGING | Facility: CLINIC | Age: 71
End: 2021-03-11
Payer: MEDICARE

## 2021-03-11 DIAGNOSIS — Z98.890 OTHER SPECIFIED POSTPROCEDURAL STATES: Chronic | ICD-10-CM

## 2021-03-11 DIAGNOSIS — Z00.8 ENCOUNTER FOR OTHER GENERAL EXAMINATION: ICD-10-CM

## 2021-03-11 PROCEDURE — 71250 CT THORAX DX C-: CPT

## 2021-03-11 PROCEDURE — 71250 CT THORAX DX C-: CPT | Mod: 26

## 2021-03-14 ENCOUNTER — APPOINTMENT (OUTPATIENT)
Dept: DISASTER EMERGENCY | Facility: CLINIC | Age: 71
End: 2021-03-14

## 2021-03-18 ENCOUNTER — APPOINTMENT (OUTPATIENT)
Dept: PULMONOLOGY | Facility: CLINIC | Age: 71
End: 2021-03-18
Payer: MEDICARE

## 2021-03-18 VITALS
HEIGHT: 64 IN | HEART RATE: 67 BPM | OXYGEN SATURATION: 98 % | DIASTOLIC BLOOD PRESSURE: 80 MMHG | BODY MASS INDEX: 24.59 KG/M2 | SYSTOLIC BLOOD PRESSURE: 120 MMHG | WEIGHT: 144 LBS

## 2021-03-18 PROCEDURE — 99214 OFFICE O/P EST MOD 30 MIN: CPT

## 2021-03-18 PROCEDURE — 99072 ADDL SUPL MATRL&STAF TM PHE: CPT

## 2021-03-18 NOTE — DISCUSSION/SUMMARY
[FreeTextEntry1] : \par    #1. The patient's COPD appears to be at baseline clinically on Advair 250/50 and Incruse; alternatively could use Trelegy daily\par #2. He will also continue his prn Ventolin.  \par #3. I have also recommended diet and exercise for weight loss. \par #4. Chest CT with waxing and waning opacities\par #5. Follow-up CT revealed improvement in small GGO but otherwise stable findings. Repeat CT in 3-4/2021 for one year f/u\par #6. F/u in 4-5 months with PFTs \par #7. Does not want covid vaccine\par #8. Reviewed risks of exposure and symptoms of Covid-19 virus, including how the virus is spread and precautions to avoid ashley virus.\par \par Patient's questions were answered and patient appears to understand these recommendations

## 2021-03-18 NOTE — CONSULT LETTER
[Dear  ___] : Dear  [unfilled], [Consult Letter:] : I had the pleasure of evaluating your patient, [unfilled]. [Please see my note below.] : Please see my note below. [Consult Closing:] : Thank you very much for allowing me to participate in the care of this patient.  If you have any questions, please do not hesitate to contact me. [Sincerely,] : Sincerely, [FreeTextEntry3] : Richardson Babcock MD, FCCP, D. ABSM\par Pulmonary and Sleep Medicine\par [facility] [place]

## 2021-04-02 ENCOUNTER — APPOINTMENT (OUTPATIENT)
Dept: NEUROLOGY | Facility: CLINIC | Age: 71
End: 2021-04-02

## 2021-07-13 ENCOUNTER — APPOINTMENT (OUTPATIENT)
Dept: NEUROLOGY | Facility: CLINIC | Age: 71
End: 2021-07-13

## 2021-08-13 DIAGNOSIS — Z01.812 ENCOUNTER FOR PREPROCEDURAL LABORATORY EXAMINATION: ICD-10-CM

## 2021-08-16 ENCOUNTER — APPOINTMENT (OUTPATIENT)
Dept: DISASTER EMERGENCY | Facility: CLINIC | Age: 71
End: 2021-08-16

## 2021-08-19 ENCOUNTER — APPOINTMENT (OUTPATIENT)
Dept: PULMONOLOGY | Facility: CLINIC | Age: 71
End: 2021-08-19

## 2021-08-22 DIAGNOSIS — Z01.818 ENCOUNTER FOR OTHER PREPROCEDURAL EXAMINATION: ICD-10-CM

## 2021-08-24 ENCOUNTER — APPOINTMENT (OUTPATIENT)
Dept: DISASTER EMERGENCY | Facility: CLINIC | Age: 71
End: 2021-08-24

## 2021-08-25 LAB — SARS-COV-2 N GENE NPH QL NAA+PROBE: NOT DETECTED

## 2021-08-27 ENCOUNTER — APPOINTMENT (OUTPATIENT)
Dept: PULMONOLOGY | Facility: CLINIC | Age: 71
End: 2021-08-27
Payer: MEDICARE

## 2021-08-27 VITALS — WEIGHT: 133 LBS | HEIGHT: 64 IN | BODY MASS INDEX: 22.71 KG/M2

## 2021-08-27 VITALS
DIASTOLIC BLOOD PRESSURE: 66 MMHG | RESPIRATION RATE: 14 BRPM | OXYGEN SATURATION: 97 % | SYSTOLIC BLOOD PRESSURE: 134 MMHG | HEART RATE: 63 BPM

## 2021-08-27 DIAGNOSIS — E66.3 OVERWEIGHT: ICD-10-CM

## 2021-08-27 PROCEDURE — 85018 HEMOGLOBIN: CPT | Mod: QW

## 2021-08-27 PROCEDURE — 94010 BREATHING CAPACITY TEST: CPT

## 2021-08-27 PROCEDURE — 99214 OFFICE O/P EST MOD 30 MIN: CPT | Mod: 25

## 2021-08-27 PROCEDURE — 94729 DIFFUSING CAPACITY: CPT

## 2021-08-27 PROCEDURE — 94727 GAS DIL/WSHOT DETER LNG VOL: CPT

## 2021-08-27 NOTE — REASON FOR VISIT
[Follow-Up] : a follow-up visit [Abnormal CXR/ Chest CT] : an abnormal CXR/ chest CT [COPD] : COPD [Shortness of Breath] : shortness of breath [Pulmonary Nodules] : pulmonary nodules

## 2021-08-27 NOTE — CONSULT LETTER
[Dear  ___] : Dear  [unfilled], [Consult Letter:] : I had the pleasure of evaluating your patient, [unfilled]. [Please see my note below.] : Please see my note below. [Sincerely,] : Sincerely, [Consult Closing:] : Thank you very much for allowing me to participate in the care of this patient.  If you have any questions, please do not hesitate to contact me. [FreeTextEntry3] : Richardson Babcock MD, FCCP, D. ABSM\par Pulmonary and Sleep Medicine\par [facility] at [place]

## 2021-08-27 NOTE — DISCUSSION/SUMMARY
[FreeTextEntry1] : \par #1. The patient's COPD appears to be at baseline clinically on Advair 250/50 and Incruse; alternatively could use Trelegy daily\par #2. He will also continue his prn Ventolin.  \par #3. I have also recommended diet and exercise for weight loss. \par #4. Chest CT with waxing and waning opacities\par #5. Follow-up CT revealed improvement in small GGO but otherwise stable findings; could consider repeat as needed \par #6. F/u in 6 months for re-evaluation\par #7. Pt had both Covid vaccines \par #8. Reviewed risks of exposure and symptoms of Covid-19 virus, including how the virus is spread and precautions to avoid ashley virus.\par \par Patient's questions were answered and patient appears to understand these recommendations

## 2022-02-15 ENCOUNTER — APPOINTMENT (OUTPATIENT)
Dept: NEUROLOGY | Facility: CLINIC | Age: 72
End: 2022-02-15

## 2022-02-23 ENCOUNTER — APPOINTMENT (OUTPATIENT)
Dept: PULMONOLOGY | Facility: CLINIC | Age: 72
End: 2022-02-23

## 2022-04-12 ENCOUNTER — APPOINTMENT (OUTPATIENT)
Dept: PULMONOLOGY | Facility: CLINIC | Age: 72
End: 2022-04-12
Payer: MEDICARE

## 2022-04-12 VITALS
DIASTOLIC BLOOD PRESSURE: 72 MMHG | RESPIRATION RATE: 14 BRPM | OXYGEN SATURATION: 97 % | WEIGHT: 138 LBS | HEART RATE: 73 BPM | SYSTOLIC BLOOD PRESSURE: 132 MMHG | BODY MASS INDEX: 23.69 KG/M2

## 2022-04-12 DIAGNOSIS — Z23 ENCOUNTER FOR IMMUNIZATION: ICD-10-CM

## 2022-04-12 PROCEDURE — 99214 OFFICE O/P EST MOD 30 MIN: CPT

## 2022-04-12 NOTE — PROCEDURE
[FreeTextEntry1] : PFTs performed previously revealed moderate COPD with elevated lung volumes and diffusion capacity. These results were essentially at his baseline though spirometry is slightly worse and may be due to his non-compliance with medications.\par Spirometry 6/28/18 - near baseline and was improved from his previous and is c/w mild COPD (Stage II); he is more compliant with his inhaler therapy\par PFTs 8/14/19 - Normal FVC and mildly reduced FEV1 with an obstructive pattern with elevated lung volumes c/w hyperinflation and gas trapping but normal diffusion capacity\par PFTs 8/27/21 - Normal FVC and FEV1 with an obstructive pattern and normal lung volumes and diffusion capacity; overall improved

## 2022-04-12 NOTE — DISCUSSION/SUMMARY
[FreeTextEntry1] : \par #1. The patient's COPD appears to be at baseline clinically on Advair 250/50 and Incruse; alternatively could use Trelegy daily; consider decrease as tolerates\par #2. He will also continue his prn Ventolin.  \par #3. I have also recommended diet and exercise for weight loss. \par #4. Chest CT with waxing and waning opacities but last CT was improved otherwise stable findings; could consider repeat as needed but pt does not want any further imaging studies for now\par #5. F/u in 6 months for re-evaluation with PFTs\par #6. Pt had both Covid vaccines and booster\par #7. Reviewed risks of exposure and symptoms of Covid-19 virus, including how the virus is spread and precautions to avoid ashley virus.\par \par The patient expressed understanding and agreement with the above recommendations/plan and accepts responsibility to be compliant with recommended testing, therapies, and f/u visits.\par All relevant questions and concerns were addressed.

## 2022-04-12 NOTE — CONSULT LETTER
[Dear  ___] : Dear  [unfilled], [Consult Letter:] : I had the pleasure of evaluating your patient, [unfilled]. [Please see my note below.] : Please see my note below. [Consult Closing:] : Thank you very much for allowing me to participate in the care of this patient.  If you have any questions, please do not hesitate to contact me. [Sincerely,] : Sincerely, [FreeTextEntry3] : Richardson Babcock MD, FCCP, D. ABSM\par Pulmonary and Sleep Medicine\par [facility] at [place]

## 2022-10-03 DIAGNOSIS — Z01.812 ENCOUNTER FOR PREPROCEDURAL LABORATORY EXAMINATION: ICD-10-CM

## 2022-10-07 LAB — SARS-COV-2 N GENE NPH QL NAA+PROBE: NOT DETECTED

## 2022-10-11 ENCOUNTER — APPOINTMENT (OUTPATIENT)
Dept: PULMONOLOGY | Facility: CLINIC | Age: 72
End: 2022-10-11

## 2022-10-11 VITALS
DIASTOLIC BLOOD PRESSURE: 70 MMHG | OXYGEN SATURATION: 98 % | RESPIRATION RATE: 14 BRPM | HEART RATE: 78 BPM | SYSTOLIC BLOOD PRESSURE: 120 MMHG

## 2022-10-11 VITALS — BODY MASS INDEX: 22.88 KG/M2 | HEIGHT: 64 IN | WEIGHT: 134 LBS

## 2022-10-11 DIAGNOSIS — R91.1 SOLITARY PULMONARY NODULE: ICD-10-CM

## 2022-10-11 DIAGNOSIS — J44.9 CHRONIC OBSTRUCTIVE PULMONARY DISEASE, UNSPECIFIED: ICD-10-CM

## 2022-10-11 DIAGNOSIS — Z87.891 PERSONAL HISTORY OF NICOTINE DEPENDENCE: ICD-10-CM

## 2022-10-11 DIAGNOSIS — R06.02 SHORTNESS OF BREATH: ICD-10-CM

## 2022-10-11 DIAGNOSIS — R93.89 ABNORMAL FINDINGS ON DIAGNOSTIC IMAGING OF OTHER SPECIFIED BODY STRUCTURES: ICD-10-CM

## 2022-10-11 PROCEDURE — 99214 OFFICE O/P EST MOD 30 MIN: CPT | Mod: 25

## 2022-10-11 PROCEDURE — 94729 DIFFUSING CAPACITY: CPT

## 2022-10-11 PROCEDURE — 85018 HEMOGLOBIN: CPT | Mod: QW

## 2022-10-11 PROCEDURE — 94727 GAS DIL/WSHOT DETER LNG VOL: CPT

## 2022-10-11 PROCEDURE — 94010 BREATHING CAPACITY TEST: CPT

## 2022-10-11 RX ORDER — ALBUTEROL SULFATE 90 UG/1
108 (90 BASE) AEROSOL, METERED RESPIRATORY (INHALATION)
Qty: 1 | Refills: 2 | Status: ACTIVE | COMMUNITY
Start: 2022-10-11 | End: 1900-01-01

## 2022-10-11 NOTE — CONSULT LETTER
[Dear  ___] : Dear  [unfilled], [Consult Letter:] : I had the pleasure of evaluating your patient, [unfilled]. [Please see my note below.] : Please see my note below. [Consult Closing:] : Thank you very much for allowing me to participate in the care of this patient.  If you have any questions, please do not hesitate to contact me. [Sincerely,] : Sincerely, [FreeTextEntry3] : Richardson Babcock MD, FCCP, D. ABSM\par Pulmonary and Sleep Medicine\par [facility] at [place]

## 2022-10-11 NOTE — HISTORY OF PRESENT ILLNESS
TM did not submit results for exposure testing, none found in epic, [facility], or [employer]. Today is day 14 from exposure,  no longer actively monitoring and sx tracker turned off.   [Doing Well] : doing well [Goals--Doing Well] : the patient is doing well with ~his/her~ COPD goals [CT Scan] : a CT scan [Patient] : the patient [Indication ___] : for an indication of [unfilled] [None] : no new symptoms reported [On ___] : performed on [unfilled] [FreeTextEntry1] : The patient is also followed for nodular opacities seen on previous chest CT which were thought to be due to mucoid impaction. Repeat chest CT revealed a new 6 mm GGO in ODALIS and tree-in-bud area in RUL. He was asymptomatic without any respiratory complaints. He has had waxing and waning nodules and tree-in-bud opacities for years.\par He is somewhat non-compliant with inhaler therapy and recommendations for imaging studies at times but is more compliant now. [Adherent] : the patient is not adherent with ~his/her~ medication regimen [FreeTextEntry5] : Chest CT [FreeTextEntry8] : tiny RUL nodularity vs mucoid impaction

## 2022-10-11 NOTE — PROCEDURE
[FreeTextEntry1] : PFTs performed previously revealed moderate COPD with elevated lung volumes and diffusion capacity. These results were essentially at his baseline though spirometry is slightly worse and may be due to his non-compliance with medications.\par Spirometry 6/28/18 - near baseline and was improved from his previous and is c/w mild COPD (Stage II); he is more compliant with his inhaler therapy\par PFTs 8/14/19 - Normal FVC and mildly reduced FEV1 with an obstructive pattern with elevated lung volumes c/w hyperinflation and gas trapping but normal diffusion capacity\par PFTs 8/27/21 - Normal FVC and FEV1 with an obstructive pattern and normal lung volumes and diffusion capacity; overall improved\par PFTs 10/11/22 - Normal FVC and FEV1 with an obstructive pattern with elevated lung volumes and diffusion opacity; overall near baseline

## 2022-10-11 NOTE — DISCUSSION/SUMMARY
[FreeTextEntry1] : \par #1. The patient's COPD appears to be at baseline clinically on Advair 250/50 and Incruse; alternatively could use Trelegy daily; consider decrease as tolerates though uses it only on occasion.\par #2. He will also continue his prn Ventolin.  \par #3. I have also recommended diet and exercise for weight loss. \par #4. Chest CT with waxing and waning opacities but last CT was improved otherwise stable findings; could consider repeat in 6-7 months if pt agreeable\par #5. F/u in 6 months for re-evaluation with chest CT\par #6. Pt had both Covid vaccines and booster\par #7. Reviewed risks of exposure and symptoms of Covid-19 virus, including how the virus is spread and precautions to avoid ashley virus.\par \par The patient expressed understanding and agreement with the above recommendations/plan and accepts responsibility to be compliant with recommended testing, therapies, and f/u visits.\par All relevant questions and concerns were addressed.

## 2022-10-11 NOTE — PHYSICAL EXAM
[No Acute Distress] : no acute distress [Well Nourished] : well nourished [Well Developed] : well developed [Normal Appearance] : normal appearance [Normal Rate/Rhythm] : normal rate/rhythm [Normal S1, S2] : normal s1, s2 [No Murmurs] : no murmurs [No Resp Distress] : no resp distress [No Acc Muscle Use] : no acc muscle use [Normal Rhythm and Effort] : normal rhythm and effort [Clear to Auscultation Bilaterally] : clear to auscultation bilaterally [No Abnormalities] : no abnormalities [Benign] : benign [Normal Gait] : normal gait [No Clubbing] : no clubbing [No Edema] : no edema [No Focal Deficits] : no focal deficits [Oriented x3] : oriented x3

## 2023-04-24 ENCOUNTER — RX RENEWAL (OUTPATIENT)
Age: 73
End: 2023-04-24

## 2023-05-02 ENCOUNTER — RX RENEWAL (OUTPATIENT)
Age: 73
End: 2023-05-02

## 2023-10-03 RX ORDER — FLUTICASONE FUROATE, UMECLIDINIUM BROMIDE AND VILANTEROL TRIFENATATE 100; 62.5; 25 UG/1; UG/1; UG/1
100-62.5-25 POWDER RESPIRATORY (INHALATION) DAILY
Qty: 1 | Refills: 0 | Status: ACTIVE | COMMUNITY
Start: 2019-09-05 | End: 1900-01-01

## 2023-10-03 RX ORDER — FLUTICASONE FUROATE, UMECLIDINIUM BROMIDE AND VILANTEROL TRIFENATATE 100; 62.5; 25 UG/1; UG/1; UG/1
100-62.5-25 POWDER RESPIRATORY (INHALATION)
Qty: 180 | Refills: 0 | Status: COMPLETED | COMMUNITY
Start: 2023-05-02 | End: 2023-10-03